# Patient Record
Sex: FEMALE | Race: WHITE
[De-identification: names, ages, dates, MRNs, and addresses within clinical notes are randomized per-mention and may not be internally consistent; named-entity substitution may affect disease eponyms.]

---

## 2021-04-06 ENCOUNTER — HOSPITAL ENCOUNTER (EMERGENCY)
Dept: HOSPITAL 54 - ER | Age: 81
Discharge: HOME | End: 2021-04-06
Payer: MEDICARE

## 2021-04-06 VITALS — HEIGHT: 62 IN | WEIGHT: 152 LBS | BODY MASS INDEX: 27.97 KG/M2

## 2021-04-06 VITALS — SYSTOLIC BLOOD PRESSURE: 158 MMHG | DIASTOLIC BLOOD PRESSURE: 84 MMHG

## 2021-04-06 DIAGNOSIS — L03.116: Primary | ICD-10-CM

## 2021-04-06 DIAGNOSIS — M19.90: ICD-10-CM

## 2021-04-06 DIAGNOSIS — Z88.1: ICD-10-CM

## 2021-04-06 DIAGNOSIS — I10: ICD-10-CM

## 2021-04-06 LAB
ALBUMIN SERPL BCP-MCNC: 3.5 G/DL (ref 3.4–5)
ALP SERPL-CCNC: 110 U/L (ref 46–116)
ALT SERPL W P-5'-P-CCNC: 21 U/L (ref 12–78)
AST SERPL W P-5'-P-CCNC: 17 U/L (ref 15–37)
BASOPHILS # BLD AUTO: 0.1 /CMM (ref 0–0.2)
BASOPHILS NFR BLD AUTO: 1 % (ref 0–2)
BILIRUB DIRECT SERPL-MCNC: 0.1 MG/DL (ref 0–0.2)
BILIRUB SERPL-MCNC: 0.4 MG/DL (ref 0.2–1)
BUN SERPL-MCNC: 18 MG/DL (ref 7–18)
CALCIUM SERPL-MCNC: 9.5 MG/DL (ref 8.5–10.1)
CHLORIDE SERPL-SCNC: 103 MMOL/L (ref 98–107)
CO2 SERPL-SCNC: 26 MMOL/L (ref 21–32)
CREAT SERPL-MCNC: 0.7 MG/DL (ref 0.6–1.3)
EOSINOPHIL NFR BLD AUTO: 2.4 % (ref 0–6)
GLUCOSE SERPL-MCNC: 125 MG/DL (ref 74–106)
HCT VFR BLD AUTO: 47 % (ref 33–45)
HGB BLD-MCNC: 15.7 G/DL (ref 11.5–14.8)
LIPASE SERPL-CCNC: 271 U/L (ref 73–393)
LYMPHOCYTES NFR BLD AUTO: 1.2 /CMM (ref 0.8–4.8)
LYMPHOCYTES NFR BLD AUTO: 20.2 % (ref 20–44)
MCHC RBC AUTO-ENTMCNC: 34 G/DL (ref 31–36)
MCV RBC AUTO: 99 FL (ref 82–100)
MONOCYTES NFR BLD AUTO: 0.8 /CMM (ref 0.1–1.3)
MONOCYTES NFR BLD AUTO: 13.5 % (ref 2–12)
NEUTROPHILS # BLD AUTO: 3.7 /CMM (ref 1.8–8.9)
NEUTROPHILS NFR BLD AUTO: 62.9 % (ref 43–81)
PLATELET # BLD AUTO: 426 /CMM (ref 150–450)
POTASSIUM SERPL-SCNC: 3.5 MMOL/L (ref 3.5–5.1)
PROT SERPL-MCNC: 8 G/DL (ref 6.4–8.2)
RBC # BLD AUTO: 4.74 MIL/UL (ref 4–5.2)
SODIUM SERPL-SCNC: 141 MMOL/L (ref 136–145)
WBC NRBC COR # BLD AUTO: 5.9 K/UL (ref 4.3–11)

## 2021-04-06 PROCEDURE — 96365 THER/PROPH/DIAG IV INF INIT: CPT

## 2021-04-06 PROCEDURE — 86140 C-REACTIVE PROTEIN: CPT

## 2021-04-06 PROCEDURE — 96375 TX/PRO/DX INJ NEW DRUG ADDON: CPT

## 2021-04-06 PROCEDURE — 80048 BASIC METABOLIC PNL TOTAL CA: CPT

## 2021-04-06 PROCEDURE — 83690 ASSAY OF LIPASE: CPT

## 2021-04-06 PROCEDURE — 85025 COMPLETE CBC W/AUTO DIFF WBC: CPT

## 2021-04-06 PROCEDURE — 99284 EMERGENCY DEPT VISIT MOD MDM: CPT

## 2021-04-06 PROCEDURE — 36415 COLL VENOUS BLD VENIPUNCTURE: CPT

## 2021-04-06 PROCEDURE — 93971 EXTREMITY STUDY: CPT

## 2021-04-06 PROCEDURE — 80076 HEPATIC FUNCTION PANEL: CPT

## 2021-04-06 NOTE — NUR
dr tolentino back at bedside to discuss plan of care. pt was offered admission 
but refused. pt states would like to be discharged.

## 2021-04-06 NOTE — NUR
Patient came in to the er c/o RLE rash on oral antibiotic for LLE cellulitis. 
On room air, breathing evenly and unlabored. Connected to the monitor and pulse 
ox. Kept comfortable, will continue to monitor accordingly.

## 2021-04-09 ENCOUNTER — HOSPITAL ENCOUNTER (EMERGENCY)
Dept: HOSPITAL 54 - ER | Age: 81
Discharge: HOME | End: 2021-04-09
Payer: MEDICARE

## 2021-04-09 VITALS — HEIGHT: 62 IN | WEIGHT: 150 LBS | BODY MASS INDEX: 27.6 KG/M2

## 2021-04-09 VITALS — SYSTOLIC BLOOD PRESSURE: 165 MMHG | DIASTOLIC BLOOD PRESSURE: 80 MMHG

## 2021-04-09 DIAGNOSIS — L03.116: Primary | ICD-10-CM

## 2021-04-09 DIAGNOSIS — Z79.899: ICD-10-CM

## 2021-04-09 DIAGNOSIS — Z88.1: ICD-10-CM

## 2021-04-09 DIAGNOSIS — I10: ICD-10-CM

## 2021-04-09 PROCEDURE — 99283 EMERGENCY DEPT VISIT LOW MDM: CPT

## 2021-04-09 PROCEDURE — 96372 THER/PROPH/DIAG INJ SC/IM: CPT

## 2021-04-09 NOTE — NUR
PATIENT SEEN AND EVALUATED BY DR. GARCIA. Patient discharged to home in stable 
condition. Written and verbal after care instructions given. Patient verbalizes 
understanding of instruction.